# Patient Record
Sex: FEMALE | Race: BLACK OR AFRICAN AMERICAN | NOT HISPANIC OR LATINO | URBAN - METROPOLITAN AREA
[De-identification: names, ages, dates, MRNs, and addresses within clinical notes are randomized per-mention and may not be internally consistent; named-entity substitution may affect disease eponyms.]

---

## 2023-09-07 ENCOUNTER — OFFICE VISIT (OUTPATIENT)
Dept: PODIATRY | Facility: CLINIC | Age: 33
End: 2023-09-07
Payer: COMMERCIAL

## 2023-09-07 VITALS — WEIGHT: 162 LBS | HEIGHT: 64 IN | BODY MASS INDEX: 27.66 KG/M2 | RESPIRATION RATE: 16 BRPM

## 2023-09-07 DIAGNOSIS — L92.3 FOREIGN BODY GRANULOMA OF SKIN: ICD-10-CM

## 2023-09-07 DIAGNOSIS — B07.0 PLANTAR WARTS: Primary | ICD-10-CM

## 2023-09-07 DIAGNOSIS — M79.672 LEFT FOOT PAIN: ICD-10-CM

## 2023-09-07 PROCEDURE — 99203 OFFICE O/P NEW LOW 30 MIN: CPT | Performed by: PODIATRIST

## 2023-09-07 NOTE — PROGRESS NOTES
Assessment/Plan: Rule out atypical verruca versus foreign body granuloma plantar aspect left foot. Pain. Plan. Chart reviewed. Patient examined. Left foot lesion debrided. Patient advised on condition. At this time we will treat for granuloma and/or atypical verruca by applying Cantharone. Cantharone applied. Patient advised on aftercare. Return for follow-up. Diagnoses and all orders for this visit:    Plantar warts    Left foot pain    Foreign body granuloma of skin          Subjective: Patient presents for second opinion. She has a painful skin lesion on the bottom of the foot. She believes she may have a piece of glass in there. Another doctor tried to find something but lesion persists. No Known Allergies    No current outpatient medications on file. There is no problem list on file for this patient. Patient ID: Clint Barrera is a 35 y.o. female. HPI    The following portions of the patient's history were reviewed and updated as appropriate:     family history is not on file. has no history on file for tobacco use, alcohol use, and drug use. Vitals:    09/07/23 1458   Resp: 16       Review of Systems      Objective:  Patient's shoes and socks removed. Foot ExamPhysical Exam  Vitals and nursing note reviewed. Constitutional:       Appearance: Normal appearance. Cardiovascular:      Rate and Rhythm: Normal rate and regular rhythm. Skin:     Capillary Refill: Capillary refill takes less than 2 seconds. Comments: Submetatarsal one of the left foot demonstrates a 0.5 cm² skin lesion consistent with foreign body granuloma or atypical verruca. Pinpoint bleeding noted. Negative occult piece of glass noted   Neurological:      Mental Status: She is alert. Psychiatric:         Mood and Affect: Mood normal.         Behavior: Behavior normal.         Thought Content:  Thought content normal.         Judgment: Judgment normal.